# Patient Record
(demographics unavailable — no encounter records)

---

## 2024-12-26 NOTE — PHYSICAL EXAM
[Alert] : alert [Normal Voice/Communication] : normal voice/communication [Healthy Appearing] : healthy appearing [No Acute Distress] : no acute distress [Sclera] : the sclera and conjunctiva were normal [Hearing Threshold Finger Rub Not [place]] : hearing was normal [Normal Lips/Gums] : the lips and gums were normal [Oropharynx] : the oropharynx was normal [Normal Appearance] : the appearance of the neck was normal [No Neck Mass] : no neck mass was observed [No Respiratory Distress] : no respiratory distress [No Acc Muscle Use] : no accessory muscle use [Respiration, Rhythm And Depth] : normal respiratory rhythm and effort [Auscultation Breath Sounds / Voice Sounds] : lungs were clear to auscultation bilaterally [Heart Rate And Rhythm] : heart rate was normal and rhythm regular [Normal S1, S2] : normal S1 and S2 [Murmurs] : no murmurs [Bowel Sounds] : normal bowel sounds [Abdomen Tenderness] : non-tender [No Masses] : no abdominal mass palpated [Abdomen Soft] : soft [] : no hepatosplenomegaly [Oriented To Time, Place, And Person] : oriented to person, place, and time

## 2024-12-26 NOTE — HISTORY OF PRESENT ILLNESS
[FreeTextEntry1] : 43 yo female, paternal hx of multiple polyps , for colon cancer screening. Started zepbound 3 weeks ago. Hx of ADHD. UTD with mammograms and skin cancer screening. Family hx of melanoma.

## 2024-12-26 NOTE — ASSESSMENT
[FreeTextEntry1] : 43 yo female, paternal hx of multiple polyps , for colon cancer screening. Started zepbound 3 weeks ago. Hx of ADHD. UTD with mammograms and skin cancer screening. Family hx of melanoma.  IMP: 1. colon cancer screening, family hx of colon polyps 2. Obesity 3. HLD 4. ADHD, controlled  PLAN: She was advised to undergo colonoscopy to which she  agreed. The procedure will be performed in [facility] with the assistance of an anesthesiologist. The procedure was explained in detail and she understood the risks of the procedure not limited  to infection, bleeding, perforation, risk of anesthesia and risk of IV site problems,emergency surgery, missed lesions  or non-diagnosis of colon cancer. She  was advised that she could not drive home alone, if the patient chooses to receive sedation. Further diagnostic and treatment recommendations will be based upon the procedure and any biopsies, if they are taken. hold Zepbound 7 days prior to colonoscopy to reduce risk of aspiration

## 2025-01-08 NOTE — HISTORY OF PRESENT ILLNESS
[FreeTextEntry1] : Ms. YOANNA JOYA  is a 42 year old  female who has a past medical history significant for  Morbid Obesity, Pre-T2DM, Hyperlipidemia who presents to the office for a follow up evaluation. Patient feels generally well today. Denies SOB, chest pain, palpitations, dizziness, and syncope.   ================ ================ 1/2025 zepbound 2.5mg feeling well  loose stool  night time/binge eating  1 and 5 y/o room for improvement with diet

## 2025-01-08 NOTE — HISTORY OF PRESENT ILLNESS
[FreeTextEntry1] : Ms. YOANNA JOYA  is a 42 year old  female who has a past medical history significant for  Morbid Obesity, Pre-T2DM, Hyperlipidemia who presents to the office for a follow up evaluation. Patient feels generally well today. Denies SOB, chest pain, palpitations, dizziness, and syncope.   ================ ================ 1/2025 zepbound 2.5mg feeling well  loose stool  night time/binge eating  1 and 3 y/o room for improvement with diet

## 2025-01-08 NOTE — DISCUSSION/SUMMARY
[FreeTextEntry1] :  Morbid Obesity, Pre-T2DM, Hyperlipidemia   - Patient to start zepbound 5mg once weekly for 4 weeks. All questions and concerns addressed. Side effects reviewed. - A detailed discussion took place about the importance of CV risk reduction and LDL lowering. - The patient understands the importance of incorporating moderate aerobic exercise, 4 times/week for 40 minutes to reduce risk of CV disease. - A detailed discussion of lifestyle modification was done today. Patient understands the importance of a heart healthy diet and incorporating veggies/legumes/fruits/whole grains and limiting processed foods, sugars and carbs in their diet. To maintain hydration with water intake throughout the day. Remove/limit sugary beverages from diet - patient understands that stress reduction along with good sleep hygiene will help aid in weight loss - Follow up in 4 weeks   - The patient has a good understanding of the diagnosis, treatment plan and lifestyle modification. she will contact me at the office for any questions with their care or any changes in their health status.   Case discussed with Dr Carmelo Simons

## 2025-02-09 NOTE — HEALTH RISK ASSESSMENT
[0] : 2) Feeling down, depressed, or hopeless: Not at all (0) [PHQ-2 Negative - No further assessment needed] : PHQ-2 Negative - No further assessment needed [Former] : Former [[id number]] : 0

## 2025-02-09 NOTE — HEALTH RISK ASSESSMENT
[0] : 2) Feeling down, depressed, or hopeless: Not at all (0) [PHQ-2 Negative - No further assessment needed] : PHQ-2 Negative - No further assessment needed [Former] : Former [[id number]] : 0

## 2025-02-09 NOTE — HISTORY OF PRESENT ILLNESS
[FreeTextEntry8] : Ms. JOYA is a 42 year old F with PMHx of HTN, asthma, DDD, postpartum pre eclampsia x 2, ADHD, post partum depression presenting for dizziness, loss of balance for 1 month, worse in the morning. Denies room spinning. Pt saw ENT who did not think it was an issue with her ears. MRI IAC only ordered by ENT Dr. Amin was neg for retrocochlear lesions, but questions bone thinning and (?) of semicircular canals. ENT gave prednisone but it did not help with hearing so much. Pt has neuro appt next week. Denies chest pain, sob, nunez, dizziness, diaphoresis, palpitations, LE swelling, orthopnea, syncope, n/v, headache.

## 2025-02-09 NOTE — ADDENDUM
[FreeTextEntry1] : This note was written by Aundrea Parra on 02/03/2025 acting as medical scribe for Dr. Ortiz Frank. I, Dr. Ortiz Frank, have read and attest that all the information, medical decision making and discharge instructions within are true and accurate.

## 2025-03-27 NOTE — REVIEW OF SYSTEMS
[Leg Weakness] : leg weakness [Numbness] : numbness [Tingling] : tingling [As Noted in HPI] : as noted in HPI [Limb Pain] : limb pain [Negative] : Endocrine [Abdominal Pain] : no abdominal pain [Vomiting] : no vomiting [Diarrhea] : no diarrhea [Incontinence] : no incontinence [Easy Bleeding] : no tendency for easy bleeding [Easy Bruising] : no tendency for easy bruising

## 2025-03-27 NOTE — ASSESSMENT
[FreeTextEntry1] : Exacerbation of low back pain without any significant radicular complaints at the present time.  She is getting benefit from acupuncture and I have recommended she continue with this.  I would like to get lumbar flexion-extension x-rays.  We will call with the results.

## 2025-03-27 NOTE — HISTORY OF PRESENT ILLNESS
[FreeTextEntry1] : The patient was initially seen in 22 with low back pain with right leg pain and weakness. She also has numbness to right buttocks to toes. MRI scan shows a large right L5-S1 disc herniation with significant neural impingement. She underwent L5- S1 microlumbar diskectomy on 22.   She had a planned  on 10/17/23 with spinal anesthesia c/b CSF leak. She had blood patch. It resolved the miserable headache and cervical neck pain. Since then, she has been having worsening right leg numbness.   MRI lumbar spine on 24 showed multilevel degenerative disc disease with mild neuroforaminal stenosis at L4-5 and L5-S1.   Now she reports low pain radiating to right buttock at times. Pain is worse upon getting out of bed and changing positions.  Pain level 3/10 to 7/10. She has weakness in right leg. She does acupuncture which is helpful

## 2025-04-01 NOTE — HISTORY OF PRESENT ILLNESS
[FreeTextEntry1] : Telehealth visit today on [facility]. Telehealth was done using 2-way audiovisual. Patient was at home. Provider was in home office. Consent was obtained by patient and provider in the visit.   ------------------------------------------------------------------------     Ms. YOANNA JOYA is a 43 year female who has a past medical history significant for Morbid Obesity, Pre-T2DM, Hyperlipidemia who presents via telehealth for a follow up evaluation. Patient feels generally well today. Denies SOB, chest pain, palpitations, dizziness, and syncope.    ================ 1/2025 zepbound 2.5mg feeling well loose stool night time/binge eating 1 and 5 y/o room for improvement with diet =============  04/01/2025 Zepbound 2.5mg  has been off of the medication in the past few months as she had to stop it for a colonoscopy, then developed the Flu, COVID, severe case of vertigo and now overcoming bronchitis She resumed Zepbound 2.5 last week and will continue to use weekly as directed loose stool that is noted primarily when she eats food high in fat, hence, has started to cut down on high fat/ processed foods for which she feels better  most recent HgbA1c is noted at 5.9, increased from 5.5, however states she has repeat scheduled next month as she feels it is from steroid induced hyperglycemia during her recent fight with the Flu and COVID.

## 2025-04-01 NOTE — DISCUSSION/SUMMARY
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  - does not need a new script as she has from the prior months - improve diet choices, low fat/ no processed food  - All side effects reviewed. Questions and concerns addressed. - A detailed discussion took place about the importance of CV risk reduction - The patient understands the importance of incorporating moderate aerobic exercise, 4 times/week for 40 minutes to reduce risk of CV disease. - A detailed discussion of lifestyle modification was done today. Patient understands the importance of a heart healthy diet and incorporating veggies/legumes/fruits/whole grains and limiting processed foods, sugars and carbs in their diet. - patient understands that stress reduction along with good sleep hygiene will help aid in weight loss - Follow up in 6-8 weeks   - The patient has a good understanding of the diagnosis, treatment plan and lifestyle modification. she will contact me at the office for any questions with their care or any changes in their health status.   Case discussed with Dr Carmelo Simons.

## 2025-05-21 NOTE — HISTORY OF PRESENT ILLNESS
[FreeTextEntry1] : Telehealth visit today on [facility]. Telehealth was done using 2-way audiovisual. Patient was at home. Provider was in home office. Consent was obtained by patient and provider in the visit.   ------------------------------------------------------------------------     Ms. YOANNA JOYA is a 43 year female who has a past medical history significant for Morbid Obesity, Pre-T2DM, Hyperlipidemia who presents via telehealth for a follow up evaluation. Patient feels generally well today. Denies SOB, chest pain, palpitations, dizziness, and syncope.    ================ 1/2025 zepbound 2.5mg feeling well loose stool night time/binge eating 1 and 3 y/o room for improvement with diet =============  04/01/2025 Zepbound 2.5mg  has been off of the medication in the past few months as she had to stop it for a colonoscopy, then developed the Flu, COVID, severe case of vertigo and now overcoming bronchitis She resumed Zepbound 2.5 last week and will continue to use weekly as directed loose stool that is noted primarily when she eats food high in fat, hence, has started to cut down on high fat/ processed foods for which she feels better  most recent HgbA1c is noted at 5.9, increased from 5.5, however states she has repeat scheduled next month as she feels it is from steroid induced hyperglycemia during her recent fight with the Flu and COVID.   =======================    5/21/2025 MED:  Currently on  Zepbound 5  mg dose - Appetite suppression present but not as strong as before. - No side effects, feels well.  -Pt reports exercise of walking  2-3 times a week for 30-40 minutes   -Water intake and protein intake adequate  - Will continue current dose and reassess in one month PAST MEDICAL HISTORY:  Acid reflux     Anxiety     Asthma     CVA (cerebral vascular accident)     Depression     Fatty pancreas     HLD (hyperlipidemia)     IGT (impaired glucose tolerance)     Mouth sores     PNA (pneumonia)     Pulmonary HTN     Ulcerative colitis

## 2025-05-21 NOTE — DISCUSSION/SUMMARY
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  - does not need a new script as she has from the prior months - improve diet choices, low fat/ no processed food  - All side effects reviewed. Questions and concerns addressed. - A detailed discussion took place about the importance of CV risk reduction - The patient understands the importance of incorporating moderate aerobic exercise, 4 times/week for 40 minutes to reduce risk of CV disease. - A detailed discussion of lifestyle modification was done today. Patient understands the importance of a heart healthy diet and incorporating veggies/legumes/fruits/whole grains and limiting processed foods, sugars and carbs in their diet. - patient understands that stress reduction along with good sleep hygiene will help aid in weight loss - Follow up in 6 weeks   - The patient has a good understanding of the diagnosis, treatment plan and lifestyle modification. she will contact me at the office for any questions with their care or any changes in their health status.   Case discussed with Dr Carmelo Simons.

## 2025-05-21 NOTE — HISTORY OF PRESENT ILLNESS
[FreeTextEntry1] : Telehealth visit today on [facility]. Telehealth was done using 2-way audiovisual. Patient was at home. Provider was in home office. Consent was obtained by patient and provider in the visit.   ------------------------------------------------------------------------     Ms. YOANNA JOYA is a 43 year female who has a past medical history significant for Morbid Obesity, Pre-T2DM, Hyperlipidemia who presents via telehealth for a follow up evaluation. Patient feels generally well today. Denies SOB, chest pain, palpitations, dizziness, and syncope.    ================ 1/2025 zepbound 2.5mg feeling well loose stool night time/binge eating 1 and 3 y/o room for improvement with diet =============  04/01/2025 Zepbound 2.5mg  has been off of the medication in the past few months as she had to stop it for a colonoscopy, then developed the Flu, COVID, severe case of vertigo and now overcoming bronchitis She resumed Zepbound 2.5 last week and will continue to use weekly as directed loose stool that is noted primarily when she eats food high in fat, hence, has started to cut down on high fat/ processed foods for which she feels better  most recent HgbA1c is noted at 5.9, increased from 5.5, however states she has repeat scheduled next month as she feels it is from steroid induced hyperglycemia during her recent fight with the Flu and COVID.   =======================    5/21/2025 MED:  Currently on  Zepbound 5  mg dose - Appetite suppression present but not as strong as before. - No side effects, feels well.  -Pt reports exercise of walking  2-3 times a week for 30-40 minutes   -Water intake and protein intake adequate  - Will continue current dose and reassess in one month

## 2025-07-25 NOTE — REASON FOR VISIT
[FreeTextEntry1] :  Telehealth visit today on [facility]. Telehealth was done using 2-way audiovisual. Patient was at home. Provider was in home office. Consent was obtained by patient and provider in the visit.

## 2025-07-25 NOTE — DISCUSSION/SUMMARY
[FreeTextEntry1] :  Morbid Obesity, Pre-T2DM, Hyperlipidemia  - Patient to continue / increase to Zepbound 7.5 mg once weekly, will be undergoing tubal ligation in the coming months, advised to question surgeon/anesthesia how long she will need to hold Zepbound pre-operatively - encouraged to increase water intake  - have pending follow up blood work done including Vitamin panel D/B12/Folate for fatigue  - All side effects reviewed. Questions and concerns addressed. - prescription e-prescribed to patients agreed upon [facility] at home pharmacy - A detailed discussion took place about the importance of CV risk reduction - The patient understands the importance of incorporating moderate aerobic exercise, 4 times/week for 40 minutes to reduce risk of CV disease. - A detailed discussion of lifestyle modification was done today. Patient understands the importance of a heart healthy diet and daily protein consumption to stabilize muscle mass and incorporating veggies/legumes/fruits/whole grains and limiting processed foods, sugars and carbs in their diet. - advised that a registered dietician is available for appointments if needed for further support, call office if you wish to schedule an appointment - patient understands that stress reduction along with good sleep hygiene will help aid in weight loss - The patient has a good understanding of the diagnosis, treatment plan and lifestyle modification. She will contact the office for any questions with their care or any changes in their health status.   - Patient advised to call the office to schedule a follow up in about 6 -8 weeks     Case discussed with Dr Carmelo Simons.

## 2025-07-25 NOTE — HISTORY OF PRESENT ILLNESS
May 4, 2023     Patient: Deidra Sheffield  YOB: 1958  Date of Visit: 5/4/2023      To Whom it May Concern:    Deidra Sheffield is under my professional care  Merna Little was seen in my office on 5/4/2023  Mernaotis Little may return to work on 5/8/2024  If you have any questions or concerns, please don't hesitate to call           Sincerely,          Andres Salgado MD        CC: No Recipients
[FreeTextEntry1] :  Ms. YOANNA JOYA is a 43 year female who has a past medical history significant for Morbid Obesity, Pre-T2DM, Hyperlipidemia who presents via telehealth for a follow up evaluation. Patient feels generally well today. Denies SOB, chest pain, palpitations, dizziness, and syncope.  ================ 1/2025 zepbound 2.5mg feeling well loose stool night time/binge eating 1 and 3 y/o room for improvement with diet ============= 04/01/2025 Zepbound 2.5mg has been off of the medication in the past few months as she had to stop it for a colonoscopy, then developed the Flu, COVID, severe case of vertigo and now overcoming bronchitis She resumed Zepbound 2.5 last week and will continue to use weekly as directed loose stool that is noted primarily when she eats food high in fat, hence, has started to cut down on high fat/ processed foods for which she feels better most recent HgbA1c is noted at 5.9, increased from 5.5, however states she has repeat scheduled next month as she feels it is from steroid induced hyperglycemia during her recent fight with the Flu and COVID. ======================= 5/21/2025 MED: Currently on Zepbound 5 mg dose - Appetite suppression present but not as strong as before. - No side effects, feels well. -Pt reports exercise of walking 2-3 times a week for 30-40 minutes -Water intake and protein intake adequate - Will continue current dose and reassess in one month  =============  07/25/2025   MED: Currently on Zepbound 5 mg dose once a week current weight 223 lb last weight 231 lb  - Appetite suppression present. - No side effects, feels well, despite her fatigue which she relates to working full time and being a mom - Feels she has plateaued in weight - Protein intake adequate  - advised that a registered dietician in the office if needed for further support - water intake encouraged as she knows she does not drink the minimum of 64 oz per day  - Patient reports running around with her 2 toddlers, no formal exercise program - Will start new dose of Zepbound 7.5mg,  reassess in one month - confirmed pharmacy